# Patient Record
Sex: FEMALE | Race: WHITE
[De-identification: names, ages, dates, MRNs, and addresses within clinical notes are randomized per-mention and may not be internally consistent; named-entity substitution may affect disease eponyms.]

---

## 2018-06-27 ENCOUNTER — HOSPITAL ENCOUNTER (OUTPATIENT)
Dept: HOSPITAL 92 - BICMAMMO | Age: 63
Discharge: HOME | End: 2018-06-27
Attending: INTERNAL MEDICINE
Payer: COMMERCIAL

## 2018-06-27 DIAGNOSIS — Z12.31: Primary | ICD-10-CM

## 2018-06-27 DIAGNOSIS — R92.1: ICD-10-CM

## 2018-06-27 PROCEDURE — 77067 SCR MAMMO BI INCL CAD: CPT

## 2018-06-27 PROCEDURE — 77063 BREAST TOMOSYNTHESIS BI: CPT

## 2020-01-20 ENCOUNTER — HOSPITAL ENCOUNTER (OUTPATIENT)
Dept: HOSPITAL 92 - BICMAMMO | Age: 65
Discharge: HOME | End: 2020-01-20
Attending: FAMILY MEDICINE
Payer: MEDICARE

## 2020-01-20 DIAGNOSIS — Z91.89: ICD-10-CM

## 2020-01-20 DIAGNOSIS — Z12.31: Primary | ICD-10-CM

## 2020-01-20 DIAGNOSIS — M85.89: ICD-10-CM

## 2020-01-20 DIAGNOSIS — M81.6: ICD-10-CM

## 2020-01-20 PROCEDURE — 77080 DXA BONE DENSITY AXIAL: CPT

## 2020-01-20 PROCEDURE — 77067 SCR MAMMO BI INCL CAD: CPT

## 2020-01-20 PROCEDURE — 77063 BREAST TOMOSYNTHESIS BI: CPT

## 2020-01-20 NOTE — BD
DEXA BONE DENSITY STUDY:

 

Date:  01/20/2020

 

PROVIDED CLINICAL HISTORY:  

Postmenopausal screening. 

 

FINDINGS:

 

Lumbar Spine:       BMD (g/cm2)

    L1              0.882         T-Score:  -1.0

    L2              0.922         T-Score:  -1.0

    L3              0.933         T-Score:  -1.4

    L4              0.913         T-Score:  -1.3

    Total           0.912         T-Score:  -1.2

 

Left Femoral Neck:  0.627         T-Score:  -2.0

Total Femur:        0.828         T-Score:  -0.9 

 

10 YEAR FRACTURE RISK:

Major osteoporotic fracture:  10%

Hip fracture:  1.4%

 

IMPRESSION: 

Calculated bone mineral density meets WHO criteria for osteopenia and places the patient at increased
 risk for fracture.  

 

POS: OFF

## 2020-01-20 NOTE — MMO
Bilateral MAMMO Bilat Screen [facility].

 

CLINICAL HISTORY:

Patient is 64 years old and is seen for screening. The patient has no family

history of breast cancer.  The patient has no personal history of cancer. The

patient has a history of bilateral Breast reduction in 1990 - benign.

 

VIEWS:

The views performed were:  bilateral craniocaudal with tomosynthesis and

bilateral mediolateral oblique with tomosynthesis.

 

FILMS COMPARED:

The present examination has been compared to prior imaging studies performed at

[facility] on 02/10/2014, 04/06/2015, 04/26/2016 and 06/27/2018.

 

This study has been interpreted with the assistance of computer-aided detection.

 

MAMMOGRAM FINDINGS:

The breasts are almost entirely fat.

 

There are no suspicious masses, suspicious calcifications, or new areas of

architectural distortion.

 

IMPRESSION:

THERE IS NO MAMMOGRAPHIC EVIDENCE OF MALIGNANCY.

 

A ROUTINE FOLLOW-UP MAMMOGRAM IN 1 YEAR IS RECOMMENDED.

 

THE RESULTS OF THIS EXAM WERE SENT TO THE PATIENT.

 

ACR BI-RADS Category 1 - Negative

 

MAMMOGRAPHY NOTE:

 1. A negative mammogram report should not delay a biopsy if a dominant of

 clinically suspicious mass is present.

 2. Approximately 10% to 15% of breast cancers are not detected by

 mammography.

 3. Adenosis and dense breasts may obscure an underlying neoplasm.

 

 

Reported by: SUZAN PELAEZ MD

Electonically Signed: [id number]